# Patient Record
Sex: FEMALE | Race: WHITE | URBAN - NONMETROPOLITAN AREA
[De-identification: names, ages, dates, MRNs, and addresses within clinical notes are randomized per-mention and may not be internally consistent; named-entity substitution may affect disease eponyms.]

---
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
## 2020-05-07 RX ORDER — ESCITALOPRAM OXALATE 10 MG/1
10 TABLET ORAL DAILY
Qty: 30 TABLET | Refills: 0 | Status: SHIPPED
Start: 2020-05-07 | End: 2020-05-19 | Stop reason: SDUPTHER

## 2020-05-07 NOTE — TELEPHONE ENCOUNTER
Last Appointment:  8/12/2019  Future Appointments   Date Time Provider Jorge Sutherland   5/19/2020  9:15 AM Patti Huynh, DO [facility]      [facility]jan needs lexapro to [facility] she is out

## 2020-05-19 ENCOUNTER — OFFICE VISIT (OUTPATIENT)
Dept: FAMILY MEDICINE CLINIC | Age: 20
End: 2020-05-19
Payer: COMMERCIAL

## 2020-05-19 VITALS
OXYGEN SATURATION: 99 % | WEIGHT: 161.2 LBS | DIASTOLIC BLOOD PRESSURE: 60 MMHG | TEMPERATURE: 98.4 F | HEIGHT: 63 IN | BODY MASS INDEX: 28.56 KG/M2 | SYSTOLIC BLOOD PRESSURE: 110 MMHG | HEART RATE: 90 BPM

## 2020-05-19 PROCEDURE — 99395 PREV VISIT EST AGE 18-39: CPT | Performed by: FAMILY MEDICINE

## 2020-05-19 RX ORDER — ESCITALOPRAM OXALATE 10 MG/1
10 TABLET ORAL DAILY
Qty: 30 TABLET | Refills: 5 | Status: SHIPPED
Start: 2020-05-19 | End: 2020-10-06 | Stop reason: SDUPTHER

## 2020-05-19 ASSESSMENT — ENCOUNTER SYMPTOMS
VOMITING: 0
SINUS PAIN: 0
EYE PAIN: 0
WHEEZING: 0
SHORTNESS OF BREATH: 0
CONSTIPATION: 0
ABDOMINAL PAIN: 0
NAUSEA: 0
SORE THROAT: 0
COUGH: 0
DIARRHEA: 0
CHEST TIGHTNESS: 0
BACK PAIN: 0
TROUBLE SWALLOWING: 0

## 2020-10-06 ENCOUNTER — TELEPHONE (OUTPATIENT)
Dept: FAMILY MEDICINE CLINIC | Age: 20
End: 2020-10-06

## 2020-10-06 RX ORDER — ESCITALOPRAM OXALATE 10 MG/1
10 TABLET ORAL DAILY
Qty: 30 TABLET | Refills: 1 | Status: SHIPPED
Start: 2020-10-06 | End: 2020-11-18 | Stop reason: SDUPTHER

## 2020-10-06 NOTE — TELEPHONE ENCOUNTER
Anika called in asking for her Lexapro 10 Mg to be sent to the [facility] in [place], [place] ([address]

## 2020-11-18 ENCOUNTER — OFFICE VISIT (OUTPATIENT)
Dept: FAMILY MEDICINE CLINIC | Age: 20
End: 2020-11-18
Payer: COMMERCIAL

## 2020-11-18 VITALS
WEIGHT: 166.4 LBS | HEART RATE: 64 BPM | HEIGHT: 63 IN | OXYGEN SATURATION: 99 % | TEMPERATURE: 98.3 F | BODY MASS INDEX: 29.48 KG/M2 | SYSTOLIC BLOOD PRESSURE: 118 MMHG | DIASTOLIC BLOOD PRESSURE: 66 MMHG

## 2020-11-18 PROCEDURE — 99395 PREV VISIT EST AGE 18-39: CPT | Performed by: FAMILY MEDICINE

## 2020-11-18 PROCEDURE — 90686 IIV4 VACC NO PRSV 0.5 ML IM: CPT | Performed by: FAMILY MEDICINE

## 2020-11-18 PROCEDURE — 90471 IMMUNIZATION ADMIN: CPT | Performed by: FAMILY MEDICINE

## 2020-11-18 RX ORDER — ESCITALOPRAM OXALATE 20 MG/1
20 TABLET ORAL DAILY
Qty: 90 TABLET | Refills: 1 | Status: SHIPPED
Start: 2020-11-18 | End: 2021-03-19

## 2020-11-18 ASSESSMENT — ENCOUNTER SYMPTOMS
VOMITING: 0
SINUS PAIN: 0
SORE THROAT: 0
EYE PAIN: 0
BACK PAIN: 0
NAUSEA: 0
COUGH: 0
DIARRHEA: 0
SHORTNESS OF BREATH: 0
CONSTIPATION: 0
WHEEZING: 0
ABDOMINAL PAIN: 0
CHEST TIGHTNESS: 0
TROUBLE SWALLOWING: 0

## 2020-11-18 NOTE — PROGRESS NOTES
20    Name: Aravind Brown  :2000   Sex:female   Age:20 y.o. Chief Complaint   Patient presents with    Depression     Patient presents to office for visit. She is here for follow up on Lexapro. Patient says the medication works for her, but there are a few days a month she thinks she should take two tabs. She says there are a few days a month when she has panic attacks and she can't control her anxiety. Patient would like to take extra lexapro on these days. She denies any other issues. Patient here for recheck on depression  For the most part the 10mg is working but there are some days like around her period she efeels like she needs 20mg  We discussed this  We will increase the dose to 20mg daily , she can break them in half and then on bad days take a whole one    She agreed to flu shot today  Doing very well otherwise        Review of Systems   Constitutional: Negative for appetite change, fatigue and fever. HENT: Negative for congestion, ear pain, sinus pain, sore throat and trouble swallowing. Eyes: Negative for pain. Respiratory: Negative for cough, chest tightness, shortness of breath and wheezing. Cardiovascular: Negative for chest pain, palpitations and leg swelling. Gastrointestinal: Negative for abdominal pain, constipation, diarrhea, nausea and vomiting. Endocrine: Negative for cold intolerance and heat intolerance. Genitourinary: Negative for difficulty urinating, dysuria, frequency, hematuria, pelvic pain and urgency. Musculoskeletal: Negative for arthralgias, back pain, gait problem, joint swelling and myalgias. Skin: Negative for rash and wound. Neurological: Negative for dizziness, syncope, light-headedness and headaches. Hematological: Negative for adenopathy. Psychiatric/Behavioral: Negative for confusion, dysphoric mood, self-injury, sleep disturbance and suicidal ideas. The patient is nervous/anxious.             Current Outpatient Medications:   escitalopram (LEXAPRO) 20 MG tablet, Take 1 tablet by mouth daily, Disp: 90 tablet, Rfl: 1  No Known Allergies   Past Medical History:   Diagnosis Date    Headache     Migraines     There are no active problems to display for this patient. Past Surgical History:   Procedure Laterality Date    TONSILLECTOMY        Social History     Tobacco History     Smoking Status  Never Smoker    Smokeless Tobacco Use  Never Used          Alcohol History     Alcohol Use Status  No          Drug Use     Drug Use Status  No          Sexual Activity     Sexually Active  Yes Partners  Male Comment  boyfriend            /66   Pulse 64   Temp 98.3 °F (36.8 °C)   Ht 5' 3\" (1.6 m)   Wt 166 lb 6.4 oz (75.5 kg)   LMP 10/21/2020 (Approximate)   SpO2 99%   BMI 29.48 kg/m²     EXAM:   Physical Exam  Vitals signs and nursing note reviewed. Constitutional:       Appearance: Normal appearance. She is well-developed. HENT:      Head: Normocephalic and atraumatic. Right Ear: Tympanic membrane normal.      Left Ear: Tympanic membrane normal.      Nose: Nose normal.      Mouth/Throat:      Mouth: Mucous membranes are moist.   Eyes:      Pupils: Pupils are equal, round, and reactive to light. Neck:      Musculoskeletal: Normal range of motion. Cardiovascular:      Rate and Rhythm: Normal rate and regular rhythm. Pulmonary:      Effort: Pulmonary effort is normal.      Breath sounds: Normal breath sounds. Abdominal:      General: Bowel sounds are normal.      Palpations: Abdomen is soft. Musculoskeletal:      Comments: Gait normal in the office today   Skin:     General: Skin is warm and dry. Neurological:      General: No focal deficit present. Mental Status: She is alert and oriented to person, place, and time. Psychiatric:         Mood and Affect: Mood normal.         Thought Content: Thought content normal.          Anika was seen today for depression.     Diagnoses and all orders for this visit:    Encounter for well adult exam without abnormal findings    Other depression  Comments:  doing great with lexapro  but feels like she wants to increase it to 20 mg   f/u in 6 months  sooner if she wishes to make changes  Orders:  -     escitalopram (LEXAPRO) 20 MG tablet; Take 1 tablet by mouth daily    Need for influenza vaccination  -     INFLUENZA, QUADV, 3 YRS AND OLDER, IM PF, PREFILL SYR OR SDV, 0.5ML (AFLURIA QUADV, PF)    appt in 6 months    I independently reviewed and updated the chief complaint, HPI, past medical and surgical history, medications, allergies and ROS as entered by the LPN. Seen by:   Isaiah Lombardi,

## 2021-03-01 ENCOUNTER — OFFICE VISIT (OUTPATIENT)
Dept: FAMILY MEDICINE CLINIC | Age: 21
End: 2021-03-01
Payer: COMMERCIAL

## 2021-03-01 VITALS
OXYGEN SATURATION: 100 % | HEIGHT: 63 IN | DIASTOLIC BLOOD PRESSURE: 70 MMHG | TEMPERATURE: 98.2 F | HEART RATE: 102 BPM | BODY MASS INDEX: 25.83 KG/M2 | WEIGHT: 145.8 LBS | SYSTOLIC BLOOD PRESSURE: 114 MMHG

## 2021-03-01 DIAGNOSIS — F32.9 REACTIVE DEPRESSION: ICD-10-CM

## 2021-03-01 DIAGNOSIS — F41.9 ANXIETY: Primary | ICD-10-CM

## 2021-03-01 PROCEDURE — 99214 OFFICE O/P EST MOD 30 MIN: CPT | Performed by: FAMILY MEDICINE

## 2021-03-01 RX ORDER — ALPRAZOLAM 0.25 MG/1
0.25 TABLET ORAL NIGHTLY PRN
Qty: 10 TABLET | Refills: 0 | Status: SHIPPED
Start: 2021-03-01 | End: 2021-03-12 | Stop reason: SDUPTHER

## 2021-03-01 RX ORDER — ETONOGESTREL AND ETHINYL ESTRADIOL .12; .015 MG/D; MG/D
RING VAGINAL
COMMUNITY
Start: 2021-01-11 | End: 2021-05-10

## 2021-03-01 RX ORDER — SERTRALINE HYDROCHLORIDE 100 MG/1
100 TABLET, FILM COATED ORAL DAILY
Qty: 30 TABLET | Refills: 2 | Status: SHIPPED
Start: 2021-03-01 | End: 2021-05-10 | Stop reason: SDUPTHER

## 2021-03-01 ASSESSMENT — ENCOUNTER SYMPTOMS
ABDOMINAL PAIN: 0
VOMITING: 0
WHEEZING: 0
CHEST TIGHTNESS: 0
SORE THROAT: 0
TROUBLE SWALLOWING: 0
SINUS PAIN: 0
DIARRHEA: 0
COUGH: 0
SHORTNESS OF BREATH: 0
NAUSEA: 0
BACK PAIN: 0
CONSTIPATION: 0
EYE PAIN: 0

## 2021-03-01 ASSESSMENT — PATIENT HEALTH QUESTIONNAIRE - PHQ9
SUM OF ALL RESPONSES TO PHQ QUESTIONS 1-9: 0
SUM OF ALL RESPONSES TO PHQ QUESTIONS 1-9: 0
1. LITTLE INTEREST OR PLEASURE IN DOING THINGS: 0
SUM OF ALL RESPONSES TO PHQ QUESTIONS 1-9: 0
2. FEELING DOWN, DEPRESSED OR HOPELESS: 0
SUM OF ALL RESPONSES TO PHQ9 QUESTIONS 1 & 2: 0

## 2021-03-01 NOTE — PROGRESS NOTES
3/1/21    Name: Luh Hunter  :2000   Sex:female   Age:20 y.o. Chief Complaint   Patient presents with    Anxiety     Patient presents to office for visit. Patient has had an increase in her anxiety over the past few weeks. She has had no life changes. Patient says she feels like all of her worries and stresses are piling up at once. She is in school and working. Since her last appointment patient has started vaping, smoking cigarettes, and smoking marijuana. She does not have her contraception in currently, she says that it fell out and ripped. Patient says she has appointment with gyn soon, and is going to discuss other methods of birth control. Patient says she is using condoms with her boyfriend. Patient is having panic attacks and night terrors. She says she locked herself in her bathroom for 3 hours this morning. Patient denies suicidal and homicidal thoughts. She denies self harm. Patient also has an itchy rash on bilateral antecubital spaces for the past couple of weeks. She has been putting Aquaphor on the rash with no results.      Patient here due to increased anxiety    Since her last visit here the new semester has gotten much more difficult and there are some soical issues  She lives in [place] and is going to school there   Has 3 roommates  She also started vaping tobacco and marijuana and smoking cigarettes since her last visit  She has noticed that these are making things much worse for her  She denies caffeine use, no monsters  She realizes these are all making things worse  But she is sleeping poorly an waking up with nightmares    We discussed meds and she increased the lexapro to a shole tablet with no helps  We will change to sertraline 50mg daily for 6 days then increase to 100mg daily  Also gave her a few xanax for panic attacks and night terrors that wake her up  Advised to be carefulb/c it is controlled  Side effects reviewed  Advised not to share with roommates    She agreed Also recommended counseling'          Review of Systems   Constitutional: Negative for appetite change, fatigue and fever. HENT: Negative for congestion, ear pain, sinus pain, sore throat and trouble swallowing. Eyes: Negative for pain. Respiratory: Negative for cough, chest tightness, shortness of breath and wheezing. Cardiovascular: Negative for chest pain, palpitations and leg swelling. Gastrointestinal: Negative for abdominal pain, constipation, diarrhea, nausea and vomiting. Endocrine: Negative for cold intolerance and heat intolerance. Genitourinary: Negative for difficulty urinating, dysuria, frequency, hematuria, pelvic pain and urgency. Musculoskeletal: Negative for arthralgias, back pain, gait problem, joint swelling and myalgias. Skin: Positive for rash. Negative for wound. Neurological: Negative for dizziness, syncope, light-headedness and headaches. Hematological: Negative for adenopathy. Psychiatric/Behavioral: Positive for sleep disturbance. Negative for confusion, dysphoric mood, self-injury and suicidal ideas. The patient is nervous/anxious. Current Outpatient Medications:     sertraline (ZOLOFT) 100 MG tablet, Take 1 tablet by mouth daily, Disp: 30 tablet, Rfl: 2    ALPRAZolam (XANAX) 0.25 MG tablet, Take 1 tablet by mouth nightly as needed for Anxiety (night terrors or panic attack) for up to 30 days. , Disp: 10 tablet, Rfl: 0    ELURYNG 0.12-0.015 MG/24HR vaginal ring, , Disp: , Rfl:     escitalopram (LEXAPRO) 20 MG tablet, Take 1 tablet by mouth daily, Disp: 90 tablet, Rfl: 1  No Known Allergies   Past Medical History:   Diagnosis Date    Headache     Migraines     There are no active problems to display for this patient.      Past Surgical History:   Procedure Laterality Date    TONSILLECTOMY        Social History     Tobacco History     Smoking Status  Current Some Day Smoker Smoking Start Date  2/22/2021 Smoking Frequency  0.1 packs/day Smoking Tobacco

## 2021-03-12 DIAGNOSIS — F41.9 ANXIETY: ICD-10-CM

## 2021-03-12 RX ORDER — ALPRAZOLAM 0.25 MG/1
0.25 TABLET ORAL NIGHTLY PRN
Qty: 7 TABLET | Refills: 0 | Status: SHIPPED | OUTPATIENT
Start: 2021-03-12 | End: 2021-03-22

## 2021-03-30 ENCOUNTER — OFFICE VISIT (OUTPATIENT)
Dept: FAMILY MEDICINE CLINIC | Age: 21
End: 2021-03-30
Payer: COMMERCIAL

## 2021-03-30 VITALS
HEART RATE: 66 BPM | SYSTOLIC BLOOD PRESSURE: 108 MMHG | OXYGEN SATURATION: 99 % | WEIGHT: 143.2 LBS | BODY MASS INDEX: 25.37 KG/M2 | HEIGHT: 63 IN | DIASTOLIC BLOOD PRESSURE: 60 MMHG | TEMPERATURE: 98.1 F

## 2021-03-30 DIAGNOSIS — F41.0 PANIC ATTACK: ICD-10-CM

## 2021-03-30 DIAGNOSIS — F41.9 ANXIETY: Primary | ICD-10-CM

## 2021-03-30 PROCEDURE — 99214 OFFICE O/P EST MOD 30 MIN: CPT | Performed by: FAMILY MEDICINE

## 2021-03-30 RX ORDER — HYDROXYZINE PAMOATE 25 MG/1
25 CAPSULE ORAL 2 TIMES DAILY PRN
Qty: 60 CAPSULE | Refills: 1 | Status: SHIPPED
Start: 2021-03-30 | End: 2021-05-10 | Stop reason: SDUPTHER

## 2021-03-30 ASSESSMENT — ENCOUNTER SYMPTOMS
SHORTNESS OF BREATH: 0
SINUS PAIN: 0
NAUSEA: 0
BACK PAIN: 0
COUGH: 0
TROUBLE SWALLOWING: 0
WHEEZING: 0
ABDOMINAL PAIN: 0
VOMITING: 0
DIARRHEA: 0
EYE PAIN: 0
CONSTIPATION: 0
SORE THROAT: 0
CHEST TIGHTNESS: 0

## 2021-03-30 NOTE — PROGRESS NOTES
3/30/21    Name: Maureen Boyd  :2000   Sex:female   Age:20 y.o. Chief Complaint   Patient presents with    Anxiety     Patient presents to office for visit. She says she is taking the zoloft every day. Patient says she isn't having as many panic attacks, but she does still feel anxious everyday. Patient says she often thinks she just wants this all to end, she doesn't want to feel anxious anymore. She has not scheduled with a counselor yet. 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 Patient continues to use marijuana 7 days a week. Patient here for follow up on anxiety  It is starting to get better but still feels anxious to a small degree daily  She is smoking marijuana daily still  Did advise to cut this back  It may have the opposite affect on her instead of calming it acan make the anxiety and paranoia worse    She says the sertraline is helping but it has only bee 2 weeks on the 100mg dose    Will continue this and add vistaril  She is going o get into counseling on [address] and see if that helps as well        Review of Systems   Constitutional: Negative for appetite change, fatigue and fever. HENT: Negative for congestion, ear pain, sinus pain, sore throat and trouble swallowing. Eyes: Negative for pain. Respiratory: Negative for cough, chest tightness, shortness of breath and wheezing. Cardiovascular: Negative for chest pain, palpitations and leg swelling. Gastrointestinal: Negative for abdominal pain, constipation, diarrhea, nausea and vomiting. Endocrine: Negative for cold intolerance and heat intolerance. Genitourinary: Negative for difficulty urinating, dysuria, frequency, hematuria, pelvic pain and urgency. Musculoskeletal: Negative for arthralgias, back pain, gait problem, joint swelling and myalgias. Skin: Negative for rash and wound.    Neurological: Negative for dizziness, syncope, light-headedness and headaches. Hematological: Negative for adenopathy. Psychiatric/Behavioral: Positive for dysphoric mood. Negative for confusion, self-injury, sleep disturbance and suicidal ideas. The patient is nervous/anxious. Current Outpatient Medications:     hydrOXYzine (VISTARIL) 25 MG capsule, Take 1 capsule by mouth 2 times daily as needed for Anxiety, Disp: 60 capsule, Rfl: 1    ELURYNG 0.12-0.015 MG/24HR vaginal ring, , Disp: , Rfl:     sertraline (ZOLOFT) 100 MG tablet, Take 1 tablet by mouth daily, Disp: 30 tablet, Rfl: 2  No Known Allergies   Past Medical History:   Diagnosis Date    Headache     Migraines     There are no active problems to display for this patient. Past Surgical History:   Procedure Laterality Date    TONSILLECTOMY        Social History     Tobacco History     Smoking Status  Current Some Day Smoker Smoking Start Date  2/22/2021 Smoking Frequency  0.1 packs/day Smoking Tobacco Type  Cigarettes    Smokeless Tobacco Use  Never Used          Alcohol History     Alcohol Use Status  Yes          Drug Use     Drug Use Status  Yes Types  Marijuana Frequency   7 times/week          Sexual Activity     Sexually Active  Yes Partners  Male Birth Control/Protection  Condom Comment  boyfriend            /60   Pulse 66   Temp 98.1 °F (36.7 °C)   Ht 5' 3\" (1.6 m)   Wt 143 lb 3.2 oz (65 kg)   SpO2 99%   BMI 25.37 kg/m²     EXAM:   Physical Exam  Vitals signs and nursing note reviewed. Constitutional:       General: She is not in acute distress. Appearance: Normal appearance. She is well-developed. HENT:      Head: Normocephalic and atraumatic. Right Ear: Tympanic membrane normal.      Left Ear: Tympanic membrane normal.      Nose: Nose normal.      Mouth/Throat:      Mouth: Mucous membranes are moist.   Eyes:      Pupils: Pupils are equal, round, and reactive to light. Neck:      Musculoskeletal: Normal range of motion.    Cardiovascular:      Rate and Rhythm: Normal rate and regular rhythm. Pulmonary:      Effort: Pulmonary effort is normal.      Breath sounds: Normal breath sounds. Abdominal:      General: Bowel sounds are normal.      Palpations: Abdomen is soft. Musculoskeletal:      Comments: Gait normal in the office today   Skin:     General: Skin is warm and dry. Neurological:      General: No focal deficit present. Mental Status: She is alert and oriented to person, place, and time. Psychiatric:         Mood and Affect: Mood normal.         Thought Content: Thought content normal.      Comments: Pretty anxious though, pradip Donaldson was seen today for anxiety. Diagnoses and all orders for this visit:    Anxiety  Comments:  continue sertraline it is starting to help    Panic attack  Comments:  hydroxyzine as needed  asvised agout fatigue  recommend she stop marijuana, unless medical    Other orders  -     hydrOXYzine (VISTARIL) 25 MG capsule; Take 1 capsule by mouth 2 times daily as needed for Anxiety    recheck in 6 weeks  The sertraline should be working better at that point      I independently reviewed and updated the chief complaint, HPI, past medical and surgical history, medications, allergies and ROS as entered by the LPN. Seen by:   Mahamed Abdi DO

## 2021-05-10 ENCOUNTER — OFFICE VISIT (OUTPATIENT)
Dept: FAMILY MEDICINE CLINIC | Age: 21
End: 2021-05-10
Payer: COMMERCIAL

## 2021-05-10 VITALS
BODY MASS INDEX: 23.42 KG/M2 | HEIGHT: 63 IN | DIASTOLIC BLOOD PRESSURE: 66 MMHG | SYSTOLIC BLOOD PRESSURE: 110 MMHG | WEIGHT: 132.2 LBS | HEART RATE: 56 BPM | TEMPERATURE: 98.1 F | OXYGEN SATURATION: 99 %

## 2021-05-10 DIAGNOSIS — F32.A ANXIETY AND DEPRESSION: Primary | ICD-10-CM

## 2021-05-10 DIAGNOSIS — F41.9 ANXIETY AND DEPRESSION: Primary | ICD-10-CM

## 2021-05-10 DIAGNOSIS — R51.9 NONINTRACTABLE EPISODIC HEADACHE, UNSPECIFIED HEADACHE TYPE: ICD-10-CM

## 2021-05-10 PROCEDURE — 99213 OFFICE O/P EST LOW 20 MIN: CPT | Performed by: FAMILY MEDICINE

## 2021-05-10 RX ORDER — SERTRALINE HYDROCHLORIDE 100 MG/1
150 TABLET, FILM COATED ORAL DAILY
Qty: 45 TABLET | Refills: 2 | Status: SHIPPED
Start: 2021-05-10 | End: 2021-08-17

## 2021-05-10 RX ORDER — HYDROXYZINE PAMOATE 25 MG/1
25 CAPSULE ORAL 2 TIMES DAILY PRN
Qty: 60 CAPSULE | Refills: 2 | Status: SHIPPED | OUTPATIENT
Start: 2021-05-10 | End: 2021-06-09

## 2021-05-10 ASSESSMENT — ENCOUNTER SYMPTOMS
ABDOMINAL PAIN: 0
VOMITING: 0
SORE THROAT: 0
BACK PAIN: 0
CONSTIPATION: 0
NAUSEA: 0
WHEEZING: 0
CHEST TIGHTNESS: 0
EYE PAIN: 0
SHORTNESS OF BREATH: 0
SINUS PAIN: 0
TROUBLE SWALLOWING: 0
DIARRHEA: 0
COUGH: 0

## 2021-05-10 NOTE — PROGRESS NOTES
5/10/21    Name: Valeria Olsen  :2000   Sex:female   Age:20 y.o. Chief Complaint   Patient presents with    Anxiety     Patient presents to office for visit. She says she isn't having panic attacks really anymore, which is an improvement. She does have an anxious feeling from the time she wakes up through out the day. Patient thinks the anxiety might be made worse by school. She will be on Summer break soon and she thinks this will be helpful. Patient is not really using the vaginal ring birth control she was prescribed by specialist. She says there is not possibility of pregnancy. Patient still uses marijuana seven days a week. patien there for recheck on anxiety  Since last increase in sertaline the panic attacks are gone  Still inschool so still some anxiety daily but it is a little better  Taking the hydrozyzine nightly for the most part    We discussed meds and she would like to increase the sertraline to 150mg and see if this helps  It may make that baseline anxiety a little better  Gave her some ideas for therapy this summer as well, counsleing would be  [facility] idea  If she needs a referral she will let us know    Will recheck in 3 months to see hwo she does once being out of school for the summer    She is sleeping better  Able to function more normally during the day at least no panic attacks but still high strung at times due to her anxeity    Review of Systems   Constitutional: Negative for appetite change, fatigue and fever. HENT: Negative for congestion, ear pain, sinus pain, sore throat and trouble swallowing. Eyes: Negative for pain. Respiratory: Negative for cough, chest tightness, shortness of breath and wheezing. Cardiovascular: Negative for chest pain, palpitations and leg swelling. Gastrointestinal: Negative for abdominal pain, constipation, diarrhea, nausea and vomiting. Endocrine: Negative for cold intolerance and heat intolerance.    Genitourinary: Negative for difficulty urinating, dysuria, frequency, hematuria and pelvic pain. Musculoskeletal: Negative for arthralgias, back pain, gait problem, joint swelling and myalgias. Skin: Negative for rash and wound. Neurological: Negative for dizziness, syncope, light-headedness and headaches. Hematological: Negative for adenopathy. Psychiatric/Behavioral: Negative for confusion, dysphoric mood, self-injury, sleep disturbance and suicidal ideas. The patient is nervous/anxious. Current Outpatient Medications:     hydrOXYzine (VISTARIL) 25 MG capsule, Take 1 capsule by mouth 2 times daily as needed for Anxiety, Disp: 60 capsule, Rfl: 2    sertraline (ZOLOFT) 100 MG tablet, Take 1.5 tablets by mouth daily, Disp: 45 tablet, Rfl: 2  No Known Allergies   Past Medical History:   Diagnosis Date    Headache     Migraines     Patient Active Problem List    Diagnosis Date Noted    Anxiety and depression 05/10/2021    Headache       Past Surgical History:   Procedure Laterality Date    TONSILLECTOMY        Social History     Tobacco History     Smoking Status  Current Some Day Smoker Smoking Start Date  2/22/2021 Smoking Frequency  0.1 packs/day Smoking Tobacco Type  Cigarettes    Smokeless Tobacco Use  Never Used          Alcohol History     Alcohol Use Status  Yes          Drug Use     Drug Use Status  Yes Types  Marijuana Frequency   7 times/week          Sexual Activity     Sexually Active  Yes Partners  Male Birth Control/Protection  Condom Comment  boyfriend            /66   Pulse 56   Temp 98.1 °F (36.7 °C)   Ht 5' 3\" (1.6 m)   Wt 132 lb 3.2 oz (60 kg)   SpO2 99%   BMI 23.42 kg/m²     EXAM:   Physical Exam  Vitals signs and nursing note reviewed. Constitutional:       General: She is not in acute distress. Appearance: Normal appearance. She is well-developed. She is not ill-appearing. HENT:      Head: Normocephalic and atraumatic.       Right Ear: Tympanic membrane normal.      Left Ear:

## 2021-08-17 ENCOUNTER — OFFICE VISIT (OUTPATIENT)
Dept: FAMILY MEDICINE CLINIC | Age: 21
End: 2021-08-17
Payer: COMMERCIAL

## 2021-08-17 VITALS
BODY MASS INDEX: 22.96 KG/M2 | WEIGHT: 129.6 LBS | OXYGEN SATURATION: 98 % | TEMPERATURE: 98.4 F | HEART RATE: 88 BPM | HEIGHT: 63 IN | DIASTOLIC BLOOD PRESSURE: 78 MMHG | SYSTOLIC BLOOD PRESSURE: 122 MMHG

## 2021-08-17 DIAGNOSIS — F41.9 ANXIETY: ICD-10-CM

## 2021-08-17 DIAGNOSIS — F31.32 BIPOLAR AFFECTIVE DISORDER, CURRENTLY DEPRESSED, MODERATE (HCC): Primary | ICD-10-CM

## 2021-08-17 PROCEDURE — 99213 OFFICE O/P EST LOW 20 MIN: CPT | Performed by: FAMILY MEDICINE

## 2021-08-17 RX ORDER — ARIPIPRAZOLE 10 MG/1
TABLET ORAL
COMMUNITY
Start: 2021-07-28 | End: 2022-08-10

## 2021-08-17 RX ORDER — LEVONORGESTREL / ETHINYL ESTRADIOL AND ETHINYL ESTRADIOL 150-30(84)
KIT ORAL
COMMUNITY
Start: 2021-06-23

## 2021-08-17 ASSESSMENT — ENCOUNTER SYMPTOMS
TROUBLE SWALLOWING: 0
ABDOMINAL PAIN: 0
SINUS PAIN: 0
VOMITING: 0
DIARRHEA: 0
CHEST TIGHTNESS: 0
EYE PAIN: 0
SORE THROAT: 0
BACK PAIN: 0
NAUSEA: 0
SHORTNESS OF BREATH: 0
COUGH: 0
CONSTIPATION: 0
WHEEZING: 0

## 2021-08-17 NOTE — PROGRESS NOTES
21    Name: Jason Khan  :2000   Sex:female   Age:21 y.o. Chief Complaint   Patient presents with    Anxiety     Patient presents to office for visit. She is seeing [facility] in [place] for psych. Patient says they had her stop the Zoloft. She is taking Vistaril twice a day and is on Abilify 10 mg. Patient says she was diagnosed as being bipolar. She is not going back to school, she is taking a year off. Patient says she isn't working, she is staying at her parent's house. Patient has not had appointment with counselor yet, she has only been seen for med management. Patient states that she is still having panic attacks. She is still depressed. Patient said her last suicidal thought was three days ago. Patient says she did not have a plan on how she would commit suicide she had just wished she would die. Patient says she talks to her mom when she is feeling suicidal and this seems to help. Patient here for check up  She is seeing [facility] in [place]  On medical marijuana now and bilify  She is still taking the vistaril twice a day but nt helping completely with the anxiety  She has an appt tomorrow for followu p on the abilify and will have her ask about buspar  Since they are managing meds now we will let them take the sabrina'    Cautioned about [employer]  With lung effects   And any job applications to be honest if they are drug testing    She is living at home  Done with school for now  And taking a year off'    Has not been able to do actual CBT or therapy yet  They are trying to get her scheduled bbut they are short staffed    She has occasional panic attacks but the suicide thoughts are very rare            Review of Systems   Constitutional: Negative for appetite change, fatigue and fever. HENT: Negative for congestion, ear pain, sinus pain, sore throat and trouble swallowing. Eyes: Negative for pain.    Respiratory: Negative for cough, chest tightness, shortness of breath and wheezing. Cardiovascular: Negative for chest pain, palpitations and leg swelling. Gastrointestinal: Negative for abdominal pain, constipation, diarrhea, nausea and vomiting. Endocrine: Negative for cold intolerance and heat intolerance. Genitourinary: Negative for difficulty urinating, dysuria, frequency, hematuria and pelvic pain. Musculoskeletal: Negative for arthralgias, back pain, gait problem, joint swelling and myalgias. Skin: Negative for rash and wound. Neurological: Negative for dizziness, syncope, light-headedness and headaches. Hematological: Negative for adenopathy. Psychiatric/Behavioral: Positive for dysphoric mood and suicidal ideas. Negative for confusion, self-injury and sleep disturbance. The patient is nervous/anxious.             Current Outpatient Medications:     ARIPiprazole (ABILIFY) 10 MG tablet, , Disp: , Rfl:     Levonorgest-Eth Estrad 91-Day 0.15-0.03 &0.01 MG TABS, , Disp: , Rfl:     hydrOXYzine (VISTARIL) 25 MG capsule, Take 1 capsule by mouth 2 times daily as needed for Anxiety, Disp: 60 capsule, Rfl: 2  No Known Allergies   Past Medical History:   Diagnosis Date    Headache     Migraines     Patient Active Problem List    Diagnosis Date Noted    Bipolar affective disorder, currently depressed, moderate ([facility] [address] 08/17/2021    Anxiety 05/10/2021    Headache       Past Surgical History:   Procedure Laterality Date    TONSILLECTOMY        Social History     Tobacco History     Smoking Status  Former Smoker Smoking Start Date  2/22/2021 Smoking Frequency  0.1 packs/day Smoking Tobacco Type  Cigarettes    Smokeless Tobacco Use  Never Used          Alcohol History     Alcohol Use Status  Yes          Drug Use     Drug Use Status  Yes Types  Marijuana Frequency   7 times/week          Sexual Activity     Sexually Active  Yes Partners  Male Birth Control/Protection  Condom Comment  boyfriend            /78   Pulse 88   Temp 98.4 °F (36.9 °C)   Ht 5' 3\" (1.6 m)   Wt 129 lb 9.6 oz (58.8 kg)   SpO2 98%   BMI 22.96 kg/m²     EXAM:   Physical Exam  Vitals and nursing note reviewed. Constitutional:       General: She is not in acute distress. Appearance: Normal appearance. She is well-developed. She is not ill-appearing. HENT:      Head: Normocephalic and atraumatic. Right Ear: Tympanic membrane normal.      Left Ear: Tympanic membrane normal.      Nose: Nose normal.   Eyes:      Pupils: Pupils are equal, round, and reactive to light. Cardiovascular:      Rate and Rhythm: Normal rate and regular rhythm. Pulmonary:      Effort: Pulmonary effort is normal.      Breath sounds: Normal breath sounds. Abdominal:      General: Bowel sounds are normal.      Palpations: Abdomen is soft. Musculoskeletal:      Cervical back: Normal range of motion. Skin:     General: Skin is warm and dry. Neurological:      Mental Status: She is alert and oriented to person, place, and time. Mental status is at baseline. Psychiatric:         Mood and Affect: Mood normal.         Thought Content: Thought content normal.          Anika was seen today for anxiety. Diagnoses and all orders for this visit:    Bipolar affective disorder, currently depressed, moderate ([address]  Comments:  on abilify  seeing [facility] in [place]  they are managing meds at this time    Anxiety  Comments:  vistaril not working the greatest  she will discuss with them buspar        I independently reviewed and updated the chief complaint, HPI, past medical and surgical history, medications, allergies and ROS as entered by the LPN. Seen by:   Rea Dawson DO

## 2022-08-10 ENCOUNTER — OFFICE VISIT (OUTPATIENT)
Dept: FAMILY MEDICINE CLINIC | Age: 22
End: 2022-08-10
Payer: COMMERCIAL

## 2022-08-10 VITALS
WEIGHT: 153.4 LBS | DIASTOLIC BLOOD PRESSURE: 62 MMHG | HEIGHT: 63 IN | SYSTOLIC BLOOD PRESSURE: 110 MMHG | BODY MASS INDEX: 27.18 KG/M2 | TEMPERATURE: 98.6 F | OXYGEN SATURATION: 99 % | HEART RATE: 56 BPM

## 2022-08-10 DIAGNOSIS — K58.0 IRRITABLE BOWEL SYNDROME WITH DIARRHEA: ICD-10-CM

## 2022-08-10 DIAGNOSIS — Z00.00 ENCOUNTER FOR WELL ADULT EXAM WITHOUT ABNORMAL FINDINGS: Primary | ICD-10-CM

## 2022-08-10 DIAGNOSIS — R00.1 BRADYCARDIA: ICD-10-CM

## 2022-08-10 PROCEDURE — 93000 ELECTROCARDIOGRAM COMPLETE: CPT | Performed by: FAMILY MEDICINE

## 2022-08-10 PROCEDURE — 99395 PREV VISIT EST AGE 18-39: CPT | Performed by: FAMILY MEDICINE

## 2022-08-10 RX ORDER — VORTIOXETINE 10 MG/1
TABLET, FILM COATED ORAL
COMMUNITY
Start: 2022-07-28

## 2022-08-10 ASSESSMENT — ENCOUNTER SYMPTOMS
VOMITING: 0
COUGH: 0
SORE THROAT: 0
EYE PAIN: 0
ABDOMINAL DISTENTION: 1
NAUSEA: 0
WHEEZING: 0
ABDOMINAL PAIN: 0
CONSTIPATION: 0
TROUBLE SWALLOWING: 0
BACK PAIN: 0
SHORTNESS OF BREATH: 0
CHEST TIGHTNESS: 0
SINUS PAIN: 0
DIARRHEA: 1

## 2022-08-10 ASSESSMENT — PATIENT HEALTH QUESTIONNAIRE - PHQ9
SUM OF ALL RESPONSES TO PHQ QUESTIONS 1-9: 0
5. POOR APPETITE OR OVEREATING: 0
1. LITTLE INTEREST OR PLEASURE IN DOING THINGS: 0
9. THOUGHTS THAT YOU WOULD BE BETTER OFF DEAD, OR OF HURTING YOURSELF: 0
8. MOVING OR SPEAKING SO SLOWLY THAT OTHER PEOPLE COULD HAVE NOTICED. OR THE OPPOSITE, BEING SO FIGETY OR RESTLESS THAT YOU HAVE BEEN MOVING AROUND A LOT MORE THAN USUAL: 0
SUM OF ALL RESPONSES TO PHQ QUESTIONS 1-9: 0
2. FEELING DOWN, DEPRESSED OR HOPELESS: 0
6. FEELING BAD ABOUT YOURSELF - OR THAT YOU ARE A FAILURE OR HAVE LET YOURSELF OR YOUR FAMILY DOWN: 0
SUM OF ALL RESPONSES TO PHQ QUESTIONS 1-9: 0
SUM OF ALL RESPONSES TO PHQ QUESTIONS 1-9: 0
3. TROUBLE FALLING OR STAYING ASLEEP: 0
4. FEELING TIRED OR HAVING LITTLE ENERGY: 0
SUM OF ALL RESPONSES TO PHQ9 QUESTIONS 1 & 2: 0
10. IF YOU CHECKED OFF ANY PROBLEMS, HOW DIFFICULT HAVE THESE PROBLEMS MADE IT FOR YOU TO DO YOUR WORK, TAKE CARE OF THINGS AT HOME, OR GET ALONG WITH OTHER PEOPLE: 0
7. TROUBLE CONCENTRATING ON THINGS, SUCH AS READING THE NEWSPAPER OR WATCHING TELEVISION: 0

## 2022-08-10 NOTE — PROGRESS NOTES
8/10/22    Name: Jose Fischer  :2000   Sex:female   Age:22 y.o. Chief Complaint   Patient presents with    Bloated    Diarrhea    Heart Problem     Patient presents to office for visit. She says she is bloated every time she eats lately, no matter what type of food she eats. She is having diarrhea. Patient says her smartwatch alerted her that her pulse was dropping down in the 30's in the middle of the night when she is sleeping. Patient drinks a few alcoholic drinks a couple of days a week. She says she drinks lots of water and drinks a body armour when she isn't drinking water. She is in counseling and seeing psychiatrist and feels stable on her current medications. Patient says she gets really dizzy when outside and can't tolerate hot temperatures like she used to. Here for check up and problems with bowels  It has been going on for 6 to 12 months  Initially she just thought it was a lactose issue  B/c dairy products really caused stomach cramping and diarrhea but now it is occurring more often  Stools are very soft and watery at times  Diarrhea, moving bowels a few times a day  We reviewed diet, she is not eating enough fruits and veggies  Eating enough protein  Some carbs  Has not really tried anything over the counter for it    Reviewed hher meds and she is on trintellix now, still smoking flower marijuana 4 to 5 times a day  And vistaril    Also wearing a smart phone and has noticed at times her heart rate goes in to the high 30-40 range int eh middle of the night  During the day her average is 58-65  She has also noticed sometimes it will increase into the 150's when she stands  No dizziness, no syncope  No chest pain, no shortness of breath  She thinks she is drinking plenty of fluids        Review of Systems   Constitutional:  Negative for appetite change, fatigue and fever. HENT:  Negative for congestion, ear pain, sinus pain, sore throat and trouble swallowing.     Eyes:  Negative for times/week              Sexual Activity       Sexually Active  Yes Partners  Male Birth Control/Protection  Condom Comment  boyfriend                /62   Pulse 56   Temp 98.6 °F (37 °C)   Ht 5' 3\" (1.6 m)   Wt 153 lb 6.4 oz (69.6 kg)   LMP 08/03/2022 (Approximate)   SpO2 99%   BMI 27.17 kg/m²     EXAM:   Physical Exam  Vitals and nursing note reviewed. Constitutional:       General: She is not in acute distress. Appearance: She is well-developed. She is not ill-appearing. HENT:      Head: Normocephalic and atraumatic. Right Ear: Tympanic membrane normal.      Left Ear: Tympanic membrane normal.   Eyes:      Pupils: Pupils are equal, round, and reactive to light. Cardiovascular:      Rate and Rhythm: Normal rate and regular rhythm. Pulmonary:      Effort: Pulmonary effort is normal.      Breath sounds: Normal breath sounds. Abdominal:      General: Bowel sounds are normal.      Palpations: Abdomen is soft. Musculoskeletal:      Cervical back: Normal range of motion. Comments: gAit steady   Skin:     General: Skin is warm and dry. Neurological:      Mental Status: She is alert and oriented to person, place, and time. Psychiatric:         Mood and Affect: Mood normal.         Thought Content: Thought content normal.        Anika was seen today for bloated, diarrhea and heart problem. Diagnoses and all orders for this visit:    Encounter for well adult exam without abnormal findings    Irritable bowel syndrome with diarrhea  Comments:  start withm ore fruits and veggies  metamucil daily  if not getting better may need to try viberzi or rifaximin    Bradycardia  Comments:  EKG okay  sinus mili  she is not symptomatic  increase salt in diet, stay hydrated  recheck in 6wk  Orders:  -     EKG 12 Lead      I independently reviewed and updated the chief complaint, HPI, past medical and surgical history, medications, allergies and ROS as entered by the LPN. Seen by:   Marlane Kussmaul

## 2023-07-20 ENCOUNTER — TELEPHONE (OUTPATIENT)
Dept: FAMILY MEDICINE CLINIC | Age: 23
End: 2023-07-20

## 2023-07-20 NOTE — TELEPHONE ENCOUNTER
[facility] quintin Donaldson to let her know she would need seen by Dr. Milad Pinzon in order to have the EKG done since it has been almost a year since she has been seen.  Provided our phone number for call back to schedule.  ----------------------------------------------------------  Appointment Request From: Rosangela Lorenzo     With Provider: Anastasiya Solo  [facility],  [address] [facility]]     Preferred Date Range: Any     Preferred Times: Any Time     Reason for visit: Request an Appointment     Comments:  I have a script for an EKG

## 2023-07-27 ENCOUNTER — OFFICE VISIT (OUTPATIENT)
Dept: FAMILY MEDICINE CLINIC | Age: 23
End: 2023-07-27
Payer: COMMERCIAL

## 2023-07-27 VITALS
DIASTOLIC BLOOD PRESSURE: 62 MMHG | BODY MASS INDEX: 30.76 KG/M2 | SYSTOLIC BLOOD PRESSURE: 104 MMHG | HEIGHT: 63 IN | HEART RATE: 72 BPM | OXYGEN SATURATION: 98 % | WEIGHT: 173.6 LBS | TEMPERATURE: 98.2 F

## 2023-07-27 DIAGNOSIS — R00.0 TACHYCARDIA: ICD-10-CM

## 2023-07-27 DIAGNOSIS — E07.9 THYROID DYSFUNCTION: ICD-10-CM

## 2023-07-27 DIAGNOSIS — E55.9 VITAMIN D DEFICIENCY: ICD-10-CM

## 2023-07-27 DIAGNOSIS — G90.A POSTURAL ORTHOSTATIC TACHYCARDIA SYNDROME: ICD-10-CM

## 2023-07-27 DIAGNOSIS — Z00.00 ENCOUNTER FOR WELL ADULT EXAM WITHOUT ABNORMAL FINDINGS: Primary | ICD-10-CM

## 2023-07-27 LAB
ABSOLUTE IMMATURE GRANULOCYTE: <0.03 K/UL (ref 0–0.58)
BASOPHILS ABSOLUTE: 0.06 K/UL (ref 0–0.2)
BASOPHILS RELATIVE PERCENT: 1 % (ref 0–2)
EOSINOPHILS ABSOLUTE: 0.12 K/UL (ref 0.05–0.5)
EOSINOPHILS RELATIVE PERCENT: 2 % (ref 0–6)
HCT VFR BLD CALC: 45.1 % (ref 34–48)
HEMOGLOBIN: 14.4 G/DL (ref 11.5–15.5)
IMMATURE GRANULOCYTES: 0 % (ref 0–5)
LYMPHOCYTES ABSOLUTE: 2.49 K/UL (ref 1.5–4)
LYMPHOCYTES RELATIVE PERCENT: 33 % (ref 20–42)
MCH RBC QN AUTO: 31.7 PG (ref 26–35)
MCHC RBC AUTO-ENTMCNC: 31.9 G/DL (ref 32–34.5)
MCV RBC AUTO: 99.3 FL (ref 80–99.9)
MONOCYTES ABSOLUTE: 0.74 K/UL (ref 0.1–0.95)
MONOCYTES RELATIVE PERCENT: 10 % (ref 2–12)
NEUTROPHILS ABSOLUTE: 4.18 K/UL (ref 1.8–7.3)
NEUTROPHILS RELATIVE PERCENT: 55 % (ref 43–80)
PDW BLD-RTO: 12.9 % (ref 11.5–15)
PLATELET # BLD: 363 K/UL (ref 130–450)
PMV BLD AUTO: 9.7 FL (ref 7–12)
RBC # BLD: 4.54 M/UL (ref 3.5–5.5)
WBC # BLD: 7.6 K/UL (ref 4.5–11.5)

## 2023-07-27 PROCEDURE — 93000 ELECTROCARDIOGRAM COMPLETE: CPT | Performed by: FAMILY MEDICINE

## 2023-07-27 PROCEDURE — 99395 PREV VISIT EST AGE 18-39: CPT | Performed by: FAMILY MEDICINE

## 2023-07-27 ASSESSMENT — PATIENT HEALTH QUESTIONNAIRE - PHQ9
SUM OF ALL RESPONSES TO PHQ QUESTIONS 1-9: 0
SUM OF ALL RESPONSES TO PHQ QUESTIONS 1-9: 0
2. FEELING DOWN, DEPRESSED OR HOPELESS: 0
SUM OF ALL RESPONSES TO PHQ QUESTIONS 1-9: 0
6. FEELING BAD ABOUT YOURSELF - OR THAT YOU ARE A FAILURE OR HAVE LET YOURSELF OR YOUR FAMILY DOWN: 0
8. MOVING OR SPEAKING SO SLOWLY THAT OTHER PEOPLE COULD HAVE NOTICED. OR THE OPPOSITE, BEING SO FIGETY OR RESTLESS THAT YOU HAVE BEEN MOVING AROUND A LOT MORE THAN USUAL: 0
7. TROUBLE CONCENTRATING ON THINGS, SUCH AS READING THE NEWSPAPER OR WATCHING TELEVISION: 0
5. POOR APPETITE OR OVEREATING: 0
1. LITTLE INTEREST OR PLEASURE IN DOING THINGS: 0
3. TROUBLE FALLING OR STAYING ASLEEP: 0
SUM OF ALL RESPONSES TO PHQ9 QUESTIONS 1 & 2: 0
10. IF YOU CHECKED OFF ANY PROBLEMS, HOW DIFFICULT HAVE THESE PROBLEMS MADE IT FOR YOU TO DO YOUR WORK, TAKE CARE OF THINGS AT HOME, OR GET ALONG WITH OTHER PEOPLE: 0
9. THOUGHTS THAT YOU WOULD BE BETTER OFF DEAD, OR OF HURTING YOURSELF: 0
4. FEELING TIRED OR HAVING LITTLE ENERGY: 0
SUM OF ALL RESPONSES TO PHQ QUESTIONS 1-9: 0

## 2023-07-27 ASSESSMENT — ENCOUNTER SYMPTOMS
VOMITING: 0
SORE THROAT: 0
NAUSEA: 0
BACK PAIN: 0
DIARRHEA: 0
EYE PAIN: 0
WHEEZING: 0
SHORTNESS OF BREATH: 0
CHEST TIGHTNESS: 0
TROUBLE SWALLOWING: 0
SINUS PAIN: 0
ABDOMINAL PAIN: 0
CONSTIPATION: 0
COUGH: 0

## 2023-07-27 NOTE — PROGRESS NOTES
23    Name: Partha Plunkett  :2000   Sex:female   Age:23 y.o. Chief Complaint   Patient presents with    Annual Exam    Tachycardia     Patient presents to office for check up. She was prescribed Azstarys for her ADHD. Patient says the medication was working well for her. Patient then started having racing pulse when randomly, but mostly when standing from sitting and when bending over. Azstarys was stopped by her specialist and she was advised to have an EKG. Patient denies any other complaints. Here for check up  Seeing psych for some adhd issues and they tried her on a combination stimulant and she had tachycardia issues after that  Since if has been happening when she gets up from sitting bends over to do something or going up and down steps'    She is staying hydrated  Does need labs done  Has a prescription for ekg but really should do the zio patch  Will order but ekg done today for her psych  Also labs ordered she will get them done later today    Also cautioned b/c long term marijuana use can cause a tachycardia syndrome  She has been on marijuana for at least 3 years or a little longer    No other changes  Blood pressures have been good      Review of Systems   Constitutional:  Negative for appetite change, fatigue and fever. HENT:  Negative for congestion, ear pain, sinus pain, sore throat and trouble swallowing. Eyes:  Negative for pain. Respiratory:  Negative for cough, chest tightness, shortness of breath and wheezing. Cardiovascular:  Negative for chest pain, palpitations and leg swelling. Gastrointestinal:  Negative for abdominal pain, constipation, diarrhea, nausea and vomiting. Endocrine: Negative for cold intolerance and heat intolerance. Genitourinary:  Negative for difficulty urinating, hematuria and pelvic pain. Musculoskeletal:  Negative for back pain, gait problem and joint swelling. Skin:  Negative for rash and wound.    Neurological:  Negative for

## 2023-07-28 LAB
ALBUMIN SERPL-MCNC: 4.6 G/DL (ref 3.5–5.2)
ALP BLD-CCNC: 43 U/L (ref 35–104)
ALT SERPL-CCNC: 19 U/L (ref 0–32)
ANION GAP SERPL CALCULATED.3IONS-SCNC: 17 MMOL/L (ref 7–16)
AST SERPL-CCNC: 25 U/L (ref 0–31)
BILIRUB SERPL-MCNC: 0.2 MG/DL (ref 0–1.2)
BUN BLDV-MCNC: 11 MG/DL (ref 6–20)
CALCIUM SERPL-MCNC: 9.1 MG/DL (ref 8.6–10.2)
CHLORIDE BLD-SCNC: 106 MMOL/L (ref 98–107)
CO2: 19 MMOL/L (ref 22–29)
CREAT SERPL-MCNC: 0.9 MG/DL (ref 0.5–1)
GFR SERPL CREATININE-BSD FRML MDRD: >60 ML/MIN/1.73M2
GLUCOSE BLD-MCNC: 68 MG/DL (ref 74–99)
MAGNESIUM: 2.1 MG/DL (ref 1.6–2.6)
POTASSIUM SERPL-SCNC: 4.2 MMOL/L (ref 3.5–5)
SODIUM BLD-SCNC: 142 MMOL/L (ref 132–146)
T4 FREE: 1 NG/DL (ref 0.9–1.7)
TOTAL PROTEIN: 7.6 G/DL (ref 6.4–8.3)
TSH SERPL DL<=0.05 MIU/L-ACNC: 0.75 UIU/ML (ref 0.27–4.2)
VITAMIN D 25-HYDROXY: 24.9 NG/ML (ref 30–100)

## 2023-07-31 LAB — THYROID PEROXIDASE (TPO) AB: <4 IU/ML (ref 0–25)

## 2023-08-29 ENCOUNTER — TELEPHONE (OUTPATIENT)
Dept: NON INVASIVE DIAGNOSTICS | Age: 23
End: 2023-08-29

## 2023-10-05 ENCOUNTER — PATIENT MESSAGE (OUTPATIENT)
Dept: FAMILY MEDICINE CLINIC | Age: 23
End: 2023-10-05

## 2024-01-08 DIAGNOSIS — I44.1 WENCKEBACH SECOND DEGREE AV BLOCK: ICD-10-CM

## 2024-01-08 DIAGNOSIS — R00.2 PALPITATIONS: Primary | ICD-10-CM
